# Patient Record
Sex: FEMALE | Race: WHITE | NOT HISPANIC OR LATINO | Employment: FULL TIME | URBAN - METROPOLITAN AREA
[De-identification: names, ages, dates, MRNs, and addresses within clinical notes are randomized per-mention and may not be internally consistent; named-entity substitution may affect disease eponyms.]

---

## 2023-12-13 ENCOUNTER — APPOINTMENT (OUTPATIENT)
Dept: RADIOLOGY | Facility: HOSPITAL | Age: 53
End: 2023-12-13
Payer: COMMERCIAL

## 2023-12-13 ENCOUNTER — HOSPITAL ENCOUNTER (EMERGENCY)
Facility: HOSPITAL | Age: 53
Discharge: HOME | End: 2023-12-13
Payer: COMMERCIAL

## 2023-12-13 VITALS
TEMPERATURE: 97.9 F | SYSTOLIC BLOOD PRESSURE: 155 MMHG | HEART RATE: 99 BPM | RESPIRATION RATE: 18 BRPM | HEIGHT: 72 IN | BODY MASS INDEX: 21.05 KG/M2 | DIASTOLIC BLOOD PRESSURE: 94 MMHG | OXYGEN SATURATION: 96 % | WEIGHT: 155.42 LBS

## 2023-12-13 DIAGNOSIS — M54.42 LOW BACK PAIN WITH LEFT-SIDED SCIATICA, UNSPECIFIED BACK PAIN LATERALITY, UNSPECIFIED CHRONICITY: Primary | ICD-10-CM

## 2023-12-13 PROCEDURE — 99283 EMERGENCY DEPT VISIT LOW MDM: CPT | Mod: 25

## 2023-12-13 PROCEDURE — 2500000001 HC RX 250 WO HCPCS SELF ADMINISTERED DRUGS (ALT 637 FOR MEDICARE OP): Performed by: HOME HEALTH

## 2023-12-13 PROCEDURE — 72100 X-RAY EXAM L-S SPINE 2/3 VWS: CPT

## 2023-12-13 PROCEDURE — 72100 X-RAY EXAM L-S SPINE 2/3 VWS: CPT | Performed by: RADIOLOGY

## 2023-12-13 PROCEDURE — 2500000005 HC RX 250 GENERAL PHARMACY W/O HCPCS: Performed by: HOME HEALTH

## 2023-12-13 RX ORDER — LIDOCAINE 50 MG/G
1 PATCH TOPICAL DAILY
Qty: 30 PATCH | Refills: 0 | Status: SHIPPED | OUTPATIENT
Start: 2023-12-13

## 2023-12-13 RX ORDER — METHYLPREDNISOLONE 4 MG/1
TABLET ORAL
Qty: 21 TABLET | Refills: 0 | Status: SHIPPED | OUTPATIENT
Start: 2023-12-13 | End: 2023-12-20

## 2023-12-13 RX ORDER — HYDROCODONE BITARTRATE AND ACETAMINOPHEN 5; 325 MG/1; MG/1
1 TABLET ORAL ONCE
Status: COMPLETED | OUTPATIENT
Start: 2023-12-13 | End: 2023-12-13

## 2023-12-13 RX ORDER — LIDOCAINE 560 MG/1
1 PATCH PERCUTANEOUS; TOPICAL; TRANSDERMAL DAILY
Status: DISCONTINUED | OUTPATIENT
Start: 2023-12-13 | End: 2023-12-13 | Stop reason: HOSPADM

## 2023-12-13 RX ORDER — CYCLOBENZAPRINE HCL 10 MG
5 TABLET ORAL ONCE
Status: COMPLETED | OUTPATIENT
Start: 2023-12-13 | End: 2023-12-13

## 2023-12-13 RX ORDER — CYCLOBENZAPRINE HCL 5 MG
5 TABLET ORAL 3 TIMES DAILY PRN
Qty: 9 TABLET | Refills: 0 | Status: SHIPPED | OUTPATIENT
Start: 2023-12-13 | End: 2023-12-23

## 2023-12-13 RX ORDER — HYDROCODONE BITARTRATE AND ACETAMINOPHEN 5; 325 MG/1; MG/1
1 TABLET ORAL EVERY 6 HOURS PRN
Qty: 9 TABLET | Refills: 0 | Status: SHIPPED | OUTPATIENT
Start: 2023-12-13 | End: 2023-12-16

## 2023-12-13 RX ADMIN — HYDROCODONE BITARTRATE AND ACETAMINOPHEN 1 TABLET: 5; 325 TABLET ORAL at 10:38

## 2023-12-13 RX ADMIN — LIDOCAINE 1 PATCH: 4 PATCH TOPICAL at 10:39

## 2023-12-13 RX ADMIN — CYCLOBENZAPRINE HYDROCHLORIDE 5 MG: 10 TABLET, FILM COATED ORAL at 10:38

## 2023-12-13 ASSESSMENT — LIFESTYLE VARIABLES
EVER HAD A DRINK FIRST THING IN THE MORNING TO STEADY YOUR NERVES TO GET RID OF A HANGOVER: NO
REASON UNABLE TO ASSESS: NO
EVER FELT BAD OR GUILTY ABOUT YOUR DRINKING: NO
HAVE PEOPLE ANNOYED YOU BY CRITICIZING YOUR DRINKING: NO
HAVE YOU EVER FELT YOU SHOULD CUT DOWN ON YOUR DRINKING: NO

## 2023-12-13 ASSESSMENT — COLUMBIA-SUICIDE SEVERITY RATING SCALE - C-SSRS
2. HAVE YOU ACTUALLY HAD ANY THOUGHTS OF KILLING YOURSELF?: NO
6. HAVE YOU EVER DONE ANYTHING, STARTED TO DO ANYTHING, OR PREPARED TO DO ANYTHING TO END YOUR LIFE?: NO
1. IN THE PAST MONTH, HAVE YOU WISHED YOU WERE DEAD OR WISHED YOU COULD GO TO SLEEP AND NOT WAKE UP?: NO

## 2023-12-13 ASSESSMENT — PAIN - FUNCTIONAL ASSESSMENT: PAIN_FUNCTIONAL_ASSESSMENT: 0-10

## 2023-12-13 ASSESSMENT — PAIN SCALES - GENERAL: PAINLEVEL_OUTOF10: 10 - WORST POSSIBLE PAIN

## 2023-12-13 NOTE — ED PROVIDER NOTES
"HPI   Chief Complaint   Patient presents with    Back Pain     \"Here for lower back pain that started Saturday.'       Patient is a 52-year-old female presenting to the ED with low back pain.  States that symptoms started nearly 2 days ago.  States that she was previously in a bad accident that resulted in significant trauma to her right leg at the time she also injured her back.  States that in April of this year she had an MRI of her back in which it showed that she had multiple slipped disc.  She has not had any problems with her back however until the last 2 days.  Denies any specific injury or trauma but she does work at [employer].  No known history of sciatica but does state that the pain radiates down her left leg.  Describes the pain as a burning, pulling and sharp sensation.  No fever or chills.  No bowel or bladder incontinence.  No urinary retention.  No saddle anesthesia.  No dysuria, hematuria increasing frequency.  No nausea, vomiting or abdominal pain.  No chest pain or shortness of breath.                          Perez Coma Scale Score: 15                  Patient History   Past Medical History:   Diagnosis Date    Personal history of other diseases of the respiratory system     History of asthma     Past Surgical History:   Procedure Laterality Date    OTHER SURGICAL HISTORY  02/09/2021    Knee surgery     No family history on file.  Social History     Tobacco Use    Smoking status: Never    Smokeless tobacco: Never   Vaping Use    Vaping Use: Never used   Substance Use Topics    Alcohol use: Never    Drug use: Never       Physical Exam   ED Triage Vitals [12/13/23 0948]   Temp Heart Rate Resp BP   36.6 °C (97.9 °F) 99 18 (!) 155/94      SpO2 Temp Source Heart Rate Source Patient Position   96 % Tympanic Monitor Sitting      BP Location FiO2 (%)     Right arm --       Physical Exam  Vitals reviewed.   Constitutional:       Appearance: Normal appearance.   HENT:      Head: Normocephalic.      Nose: Nose " normal.      Mouth/Throat:      Mouth: Mucous membranes are moist.      Pharynx: Oropharynx is clear.   Eyes:      Extraocular Movements: Extraocular movements intact.      Pupils: Pupils are equal, round, and reactive to light.   Cardiovascular:      Rate and Rhythm: Normal rate and regular rhythm.      Pulses: Normal pulses.      Heart sounds: Normal heart sounds.   Pulmonary:      Effort: Pulmonary effort is normal.      Breath sounds: Normal breath sounds. No wheezing, rhonchi or rales.   Musculoskeletal:         General: Normal range of motion.      Cervical back: Normal range of motion.      Comments: No midline C, T L-spine tenderness.  Reproducible paraspinal tenderness in the lumbar region as well as left gluteal region that this is pain to radiate down the left leg.  Patient is standing and ambulating well in the room.  Plantar and dorsiflexion intact bilateral.  Knee flexion extension intact follow.  Hip flexion extension intact bilateral.  Dorsal pedis pulse 2+.   Skin:     General: Skin is warm.   Neurological:      General: No focal deficit present.      Mental Status: She is alert. Mental status is at baseline.   Psychiatric:         Mood and Affect: Mood normal.         Behavior: Behavior normal.       XR lumbar spine 2-3 views   Final Result   Limited study by the patient's body habitus        Compression deformity of the superior endplate of L4, of uncertain   age.        Minimal anterior slippage of L5 on S1, which may be due to bilateral   pars defects of L5 versus degenerative in etiology.        Multilevel lumbar spondylosis             MACRO:   None        Signed by: Tamir Gonzalez 12/13/2023 11:36 AM   Dictation workstation:   [id number]          ED Course & MDM   Diagnoses as of 12/13/23 1210   Low back pain with left-sided sciatica, unspecified back pain laterality, unspecified chronicity       Medical Decision Making  Independent Historians: Patient    MDM:   52-year-old female presenting  to the ED with low back pain with left leg radiculopathy.  States that approximately a year ago she was about accident at that time had significant injury of her left leg.  States that since then she has had pain of her lower back.  Had a previous MRI in April which showed multiple slipped disc.  States that she typically does not have this pain but 2 days ago she started having significant pain in her lower back.  No known history of sciatica.  Denies bowel or bladder incontinence.  Denies urinary retention.  Denies saddle anesthesia.  Denies dysuria, hematuria creasing frequency.  No fever or chills.  On exam patient appears uncomfortable while she is standing in the room bent over.  States that she cannot get comfortable while sitting or laying down.  Has no midline C, T L-spine tenderness.  Reproducible paraspinal tenderness along the left lumbar paraspinal region and left gluteal region.  No saddle anesthesia.  Plantar and dorsiflexion intact bilateral.  Knee flexion extension intact bilateral.  Hip flexion extension intact bilateral.  Dorsal pedis pulse 2+.  clinically low suspicion of cauda equina or cord compression. Patient given Norco p.o., Flexeril p.o. and Lidoderm patch after initial assessment.  X-ray of the L-spine ordered to further evaluate symptoms.      X-ray interpreted by radiologist suggest compression deformity of the superior endplate of L4 uncertain by age.  Minimal anterior slippage of L5 on S1.  This may be due to bilateral pars defect of L5 versus degenerative etiology.  Multilevel lumbar spondylosis.  Discussed this finding with patient and family which they understand.    Patient was reassessed, patient was able to lay on the bed and states that this is the first time she been able to lay for the last 2 days.  States that she is feeling slightly better however not completely resolved.  Discussed with patient that symptoms are consistent with sciatic nerve irritation.  Will send the  patient home with a prescription of Solu-Medrol, Flexeril p.o., Lidoderm patch due to her inability to take anti-inflammatories I will send home with a short course of [place].  Did review her OARRS which she has no active prescriptions at this time.  Advised stretching techniques to help with her sciatic nerve.  Also give the patient referral to [facility] for further evaluation and management of her symptoms I discussed alternative treatments which include pain management versus physical therapy versus further imaging.  Patient agrees this plan has no further questions.  Patient stable for discharge as she was visualized ambulating emergency part without complication.  Diagnosing the patient with low back pain with left sciatica.    DDx/Differential:  Disc herniation, fracture, sciatica, muscle strain    Diagnosis: low back pain with left-sided sciatica        Dictation Disclaimer: Due to voice recognition software, sound alike and misspelled words may be contained in documentation. If you have any questions please contact me.            Procedure  Procedures     Julio Hugo PA-C  12/13/23 0238

## 2023-12-18 ENCOUNTER — HOSPITAL ENCOUNTER (EMERGENCY)
Facility: HOSPITAL | Age: 53
Discharge: HOME | End: 2023-12-18
Attending: EMERGENCY MEDICINE
Payer: COMMERCIAL

## 2023-12-18 ENCOUNTER — OFFICE VISIT (OUTPATIENT)
Dept: ORTHOPEDIC SURGERY | Facility: CLINIC | Age: 53
End: 2023-12-18
Payer: COMMERCIAL

## 2023-12-18 ENCOUNTER — APPOINTMENT (OUTPATIENT)
Dept: RADIOLOGY | Facility: HOSPITAL | Age: 53
End: 2023-12-18
Payer: COMMERCIAL

## 2023-12-18 VITALS
BODY MASS INDEX: 20.99 KG/M2 | WEIGHT: 149.91 LBS | OXYGEN SATURATION: 98 % | RESPIRATION RATE: 16 BRPM | HEIGHT: 71 IN | DIASTOLIC BLOOD PRESSURE: 74 MMHG | HEART RATE: 70 BPM | SYSTOLIC BLOOD PRESSURE: 128 MMHG | TEMPERATURE: 98.1 F

## 2023-12-18 DIAGNOSIS — M54.16 LUMBAR RADICULOPATHY: Primary | ICD-10-CM

## 2023-12-18 DIAGNOSIS — M51.369 L4-L5 DISC BULGE: Primary | ICD-10-CM

## 2023-12-18 DIAGNOSIS — M51.36 L4-L5 DISC BULGE: Primary | ICD-10-CM

## 2023-12-18 LAB
ANION GAP SERPL CALC-SCNC: 14 MMOL/L (ref 10–20)
BASOPHILS # BLD AUTO: 0.05 X10*3/UL (ref 0–0.1)
BASOPHILS NFR BLD AUTO: 0.7 %
BUN SERPL-MCNC: 12 MG/DL (ref 6–23)
CALCIUM SERPL-MCNC: 9.6 MG/DL (ref 8.6–10.3)
CHLORIDE SERPL-SCNC: 99 MMOL/L (ref 98–107)
CO2 SERPL-SCNC: 28 MMOL/L (ref 21–32)
CREAT SERPL-MCNC: 0.74 MG/DL (ref 0.5–1.05)
EOSINOPHIL # BLD AUTO: 0.1 X10*3/UL (ref 0–0.7)
EOSINOPHIL NFR BLD AUTO: 1.5 %
ERYTHROCYTE [DISTWIDTH] IN BLOOD BY AUTOMATED COUNT: 12.2 % (ref 11.5–14.5)
GFR SERPL CREATININE-BSD FRML MDRD: >90 ML/MIN/1.73M*2
GLUCOSE SERPL-MCNC: 85 MG/DL (ref 74–99)
HCT VFR BLD AUTO: 41.7 % (ref 36–46)
HGB BLD-MCNC: 14.3 G/DL (ref 12–16)
IMM GRANULOCYTES # BLD AUTO: 0.02 X10*3/UL (ref 0–0.7)
IMM GRANULOCYTES NFR BLD AUTO: 0.3 % (ref 0–0.9)
LYMPHOCYTES # BLD AUTO: 1.76 X10*3/UL (ref 1.2–4.8)
LYMPHOCYTES NFR BLD AUTO: 26.2 %
MCH RBC QN AUTO: 30.6 PG (ref 26–34)
MCHC RBC AUTO-ENTMCNC: 34.3 G/DL (ref 32–36)
MCV RBC AUTO: 89 FL (ref 80–100)
MONOCYTES # BLD AUTO: 0.58 X10*3/UL (ref 0.1–1)
MONOCYTES NFR BLD AUTO: 8.6 %
NEUTROPHILS # BLD AUTO: 4.2 X10*3/UL (ref 1.2–7.7)
NEUTROPHILS NFR BLD AUTO: 62.7 %
NRBC BLD-RTO: 0 /100 WBCS (ref 0–0)
PLATELET # BLD AUTO: 288 X10*3/UL (ref 150–450)
POTASSIUM SERPL-SCNC: 3.6 MMOL/L (ref 3.5–5.3)
RBC # BLD AUTO: 4.68 X10*6/UL (ref 4–5.2)
SODIUM SERPL-SCNC: 137 MMOL/L (ref 136–145)
WBC # BLD AUTO: 6.7 X10*3/UL (ref 4.4–11.3)

## 2023-12-18 PROCEDURE — 82374 ASSAY BLOOD CARBON DIOXIDE: CPT | Performed by: NURSE PRACTITIONER

## 2023-12-18 PROCEDURE — 36415 COLL VENOUS BLD VENIPUNCTURE: CPT | Performed by: NURSE PRACTITIONER

## 2023-12-18 PROCEDURE — 96375 TX/PRO/DX INJ NEW DRUG ADDON: CPT

## 2023-12-18 PROCEDURE — 80048 BASIC METABOLIC PNL TOTAL CA: CPT | Performed by: NURSE PRACTITIONER

## 2023-12-18 PROCEDURE — 72148 MRI LUMBAR SPINE W/O DYE: CPT

## 2023-12-18 PROCEDURE — 99284 EMERGENCY DEPT VISIT MOD MDM: CPT | Performed by: EMERGENCY MEDICINE

## 2023-12-18 PROCEDURE — 85025 COMPLETE CBC W/AUTO DIFF WBC: CPT | Performed by: NURSE PRACTITIONER

## 2023-12-18 PROCEDURE — 96374 THER/PROPH/DIAG INJ IV PUSH: CPT

## 2023-12-18 PROCEDURE — 99214 OFFICE O/P EST MOD 30 MIN: CPT | Performed by: PHYSICIAN ASSISTANT

## 2023-12-18 PROCEDURE — 72148 MRI LUMBAR SPINE W/O DYE: CPT | Performed by: RADIOLOGY

## 2023-12-18 PROCEDURE — 2500000004 HC RX 250 GENERAL PHARMACY W/ HCPCS (ALT 636 FOR OP/ED): Performed by: NURSE PRACTITIONER

## 2023-12-18 RX ORDER — PREDNISONE 20 MG/1
TABLET ORAL
Qty: 18 TABLET | Refills: 0 | Status: SHIPPED | OUTPATIENT
Start: 2023-12-18 | End: 2023-12-27

## 2023-12-18 RX ORDER — OXYCODONE AND ACETAMINOPHEN 5; 325 MG/1; MG/1
1 TABLET ORAL EVERY 6 HOURS PRN
Qty: 20 TABLET | Refills: 0 | Status: SHIPPED | OUTPATIENT
Start: 2023-12-18

## 2023-12-18 RX ORDER — DIAZEPAM 2 MG/1
TABLET ORAL
Qty: 18 TABLET | Refills: 0 | Status: SHIPPED | OUTPATIENT
Start: 2023-12-18

## 2023-12-18 RX ORDER — DIAZEPAM 5 MG/ML
2 INJECTION, SOLUTION INTRAMUSCULAR; INTRAVENOUS ONCE
Status: COMPLETED | OUTPATIENT
Start: 2023-12-18 | End: 2023-12-18

## 2023-12-18 RX ORDER — MORPHINE SULFATE 4 MG/ML
4 INJECTION, SOLUTION INTRAMUSCULAR; INTRAVENOUS ONCE
Status: COMPLETED | OUTPATIENT
Start: 2023-12-18 | End: 2023-12-18

## 2023-12-18 RX ORDER — DEXAMETHASONE SODIUM PHOSPHATE 10 MG/ML
10 INJECTION INTRAMUSCULAR; INTRAVENOUS ONCE
Status: COMPLETED | OUTPATIENT
Start: 2023-12-18 | End: 2023-12-18

## 2023-12-18 RX ADMIN — MORPHINE SULFATE 4 MG: 4 INJECTION, SOLUTION INTRAMUSCULAR; INTRAVENOUS at 16:20

## 2023-12-18 RX ADMIN — DIAZEPAM 2 MG: 10 INJECTION, SOLUTION INTRAMUSCULAR; INTRAVENOUS at 16:22

## 2023-12-18 RX ADMIN — DEXAMETHASONE SODIUM PHOSPHATE 10 MG: 10 INJECTION, SOLUTION INTRAMUSCULAR; INTRAVENOUS at 16:16

## 2023-12-18 ASSESSMENT — COLUMBIA-SUICIDE SEVERITY RATING SCALE - C-SSRS
6. HAVE YOU EVER DONE ANYTHING, STARTED TO DO ANYTHING, OR PREPARED TO DO ANYTHING TO END YOUR LIFE?: NO
2. HAVE YOU ACTUALLY HAD ANY THOUGHTS OF KILLING YOURSELF?: NO
1. IN THE PAST MONTH, HAVE YOU WISHED YOU WERE DEAD OR WISHED YOU COULD GO TO SLEEP AND NOT WAKE UP?: NO

## 2023-12-18 ASSESSMENT — PAIN - FUNCTIONAL ASSESSMENT
PAIN_FUNCTIONAL_ASSESSMENT: 0-10
PAIN_FUNCTIONAL_ASSESSMENT: 0-10

## 2023-12-18 ASSESSMENT — LIFESTYLE VARIABLES
EVER FELT BAD OR GUILTY ABOUT YOUR DRINKING: NO
HAVE YOU EVER FELT YOU SHOULD CUT DOWN ON YOUR DRINKING: NO
HAVE PEOPLE ANNOYED YOU BY CRITICIZING YOUR DRINKING: NO
EVER HAD A DRINK FIRST THING IN THE MORNING TO STEADY YOUR NERVES TO GET RID OF A HANGOVER: NO
REASON UNABLE TO ASSESS: NO

## 2023-12-18 ASSESSMENT — PAIN SCALES - GENERAL
PAINLEVEL_OUTOF10: 8
PAINLEVEL_OUTOF10: 8
PAINLEVEL_OUTOF10: 4

## 2023-12-18 NOTE — PROGRESS NOTES
Kristy Hoover is a 53 y.o. female who presents for Pain of the Lower Back.    HPI:  53-year-old female here for low back pain mostly left-sided with left leg radicular pain.  She denies any fever chills nausea vomiting ulcers.  She has no bowel or bladder complaints.  The notes from [facility] emergency department from December 13, 2023 were reviewed.    Physical exam:  Well-nourished, well kept.she can rise from a seated position, she can sit from a standing position with some difficulty.  She can stand on her heels and toes with some difficulty as well.  She has a steady gait although she seems a little antalgic on the left.   Examination of the back shows moderate tenderness in the paraspinous musculature.  There is decreased range of motion in all directions due to guarding/muscle spasms and pain at extremes.  There is good strength and no instability.  Examination of the lower extremities reveals no point tenderness, swelling, or deformity.  Range of motion of the hips, knees, and ankles are full without crepitance, instability, or exacerbation of pain.  Strength is 5/5 throughout except knee flexion extension on the left is about 4+/5, this is a pain guarding response.  No redness, abrasions, or lesions on extremities  Gross sensation intact in the extremities.  Deep tendon reflexes 2+ bilateral. Clonus negative.  Affect normal.  Alert and oriented ×3.  Coordination normal.    Imaging studies:  3 views of the lumbar spine from December 13, 2023 from [facility] emergency department were reviewed.  It shows potential superior endplate compression deformity of L4, small spondylolisthesis of L5 on S1, there may be bilateral L5 pars defects there.    Assessment:  53-year-old female here for workers comp related back pain.  This all resulted from a crushing injury several years ago.  She has had some issues with her back in the past, over the past several days that she has had an extreme exacerbation of low back pain and left leg  pain.  She had prior issues with numbness and tingling in her legs however she has been seeing a chiropractor throughout the year who has helped her quite a bit although this new flareup is worse than she is experienced before.  Left low back pain, radiating pain into the left leg throughout the left leg.  It is 50-50 back versus leg.  Nothing on the right really.  The pain got so bad at 1 point she had to take a Xanax.  She was seen in the emergency department on 13 December and given some steroid and muscle relaxers although this did not help.  This is significantly affecting her bodily function.  She cannot work, she has a lot of difficulty walking.    Plan:  I wanted to get an MRI of her lumbar spine, she thought she had 1 in April of this year but I cannot find that anywhere in the chart, she does not have a disc.  She states that she cannot wait 7 to 10 days to get approval and scheduling of the MRI she is in too much pain.  She states she wants to go back to the emergency department and try and get the MRI there.  I reached out to our on-call provider today, I reached out to our in-house orthopedic physician assistant and gave them report, I gave report to the emergency department that the patient is coming.  They can manage her pain however it is appropriate per protocol but I would like her to get an MRI of her lumbar spine without contrast.  I can see her back when that is complete.

## 2023-12-18 NOTE — ED PROVIDER NOTES
"HPI   Chief Complaint   Patient presents with    Back Pain     \"Here for back pain that has been going on for 9 days.  No injury or falls.  Went to center for ortho and they would not do MRI so she came [place] [facility] [place].\"       53-year-old female returns the emergency department, had presented several days ago complaining of low back pain, saw the orthopedic clinic today who recommended she return back to the emergency department for an urgent MRI.  She describes a severe lower back pain that radiates into her left lower extremity, all all the way to her toes, states when she attempts to stand or weight-bear on her legs the pain is significantly worse, she has been crawling around on the floor due to the severe pain.  She denies any incontinence of bowel/bladder or saddle anesthesia, although states several days ago she was having these symptoms.    Patient states earlier this year she did have an MRI that showed 4 herniated disks.  Neuro spine PAs concerns these may have further herniated or degenerated and is recommending emergent MRI      History provided by:  Patient   used: No                        [place] Coma Scale Score: 15                  Patient History   Past Medical History:   Diagnosis Date    Personal history of other diseases of the respiratory system     History of asthma     Past Surgical History:   Procedure Laterality Date    OTHER SURGICAL HISTORY  02/09/2021    Knee surgery     No family history on file.  Social History     Tobacco Use    Smoking status: Never    Smokeless tobacco: Never   Vaping Use    Vaping Use: Never used   Substance Use Topics    Alcohol use: Never    Drug use: Yes     Comment: cbd gummie       Physical Exam   ED Triage Vitals [12/18/23 1106]   Temp Heart Rate Resp BP   36.7 °C (98.1 °F) 90 16 137/65      SpO2 Temp Source Heart Rate Source Patient Position   99 % Tympanic Monitor Sitting      BP Location FiO2 (%)     Right arm --       Physical " Exam  Gen.: Vitals noted no distress. Afebrile.   Heart: Regular rate rhythm no murmur.   Lungs: Clear to auscultation bilaterally with good aeration and no adventitious breath sounds.   Back: There is tenderness to palpation in the midline and paraspinal planes throughout the LS. Normal motor sensory, symmetric reflexes, strong equal peripheral pulses, normal Babinski's bilaterally.   Skin: No rash.   Neuro: No focal neurologic deficits, NIH score of 0.     ED Course & MDM   Diagnoses as of 12/18/23 1557   L4-L5 disc bulge     Labs Reviewed   BASIC METABOLIC PANEL - Normal       Result Value    Glucose 85      Sodium 137      Potassium 3.6      Chloride 99      Bicarbonate 28      Anion Gap 14      Urea Nitrogen 12      Creatinine 0.74      eGFR >90      Calcium 9.6     CBC WITH AUTO DIFFERENTIAL    WBC 6.7      nRBC 0.0      RBC 4.68      Hemoglobin 14.3      Hematocrit 41.7      MCV 89      MCH 30.6      MCHC 34.3      RDW 12.2      Platelets 288      Neutrophils % 62.7      Immature Granulocytes %, Automated 0.3      Lymphocytes % 26.2      Monocytes % 8.6      Eosinophils % 1.5      Basophils % 0.7      Neutrophils Absolute 4.20      Immature Granulocytes Absolute, Automated 0.02      Lymphocytes Absolute 1.76      Monocytes Absolute 0.58      Eosinophils Absolute 0.10      Basophils Absolute 0.05          MR lumbar spine wo IV contrast   Final Result   Large central and left paracentral disc herniation at L4-L5 as   detailed. It causes moderate central canal stenosis and effacement of   left lateral recess at L4-L5 and upper L5 levels with mass effect on   adjacent nerve roots.        Additional findings as detailed.        Signed by: Ashely Hunt 12/18/2023 2:39 PM   Dictation workstation:   [id number]           Medical Decision Making  Discussed with orthospine PA, he did recommend she obtain an urgent MRI.    MRI lumbar spine was ordered.  Results as documented.    I did reach out, spoke with Dr. Nugent  who consulted neuro spine at [facility], Dr. Dev Ahmadi, who felt that if the patient could not tolerate her symptoms that she could be transferred to their facility as Dr. Cortez is out of town this week.    I spoke with the patient, she would like to try pain medications and medically managing her symptoms, would like to try physical therapy as well.    Had extensive conversation with the patient about her MRI findings and the recommendation for surgery.  She would feel more comfortable waiting for Dr. Cortez to evaluate her.    Patient was medicated with Valium, morphine and Decadron in the department, will be discharged home on similar medications.    Spoke with ISAC Galvan who will place referral for PT.    Patient understands that she should return with any worsening symptoms or any additional concerns        Shared NABIL Attestation:    This patient was seen by the advanced practice provider.  I have personally performed a substantive portion of the encounter including the physical exam.  My assessment reveals: Regular rate and rhythm cardiac exam with clear breath sounds bilaterally.  Abdomen is soft and nontender.  Lumbar spine has some tenderness to palpation but no step-offs or deformities.  There is no saddle anesthesia.  Neurological exam is grossly intact.    I have seen and examined the patient, agree with the workup, evaluation, medical decision making, management and diagnosis.  The care plan has been discussed.     Heladio Russell MD    Procedure  Procedures     Vidya Bo, APRN-CNP  12/18/23 3720

## 2023-12-18 NOTE — DISCHARGE INSTRUCTIONS
Physical therapy was arranged by the PA at the orthopedic clinic.    If pain is intolerable you may return here or to [facility] for hospitalization and/or surgical evaluation.

## 2023-12-22 ENCOUNTER — TELEPHONE (OUTPATIENT)
Dept: ORTHOPEDIC SURGERY | Facility: CLINIC | Age: 53
End: 2023-12-22
Payer: COMMERCIAL

## 2023-12-22 DIAGNOSIS — M54.16 LUMBAR RADICULOPATHY: ICD-10-CM

## 2023-12-22 NOTE — TELEPHONE ENCOUNTER
Pt called stating has only 5 pain pills left afraid she may not make it til Tues re: the pain without going to [facility] again. I advised her if still as bad to come in first thing Tues thru cast room.

## 2023-12-26 ENCOUNTER — HOSPITAL ENCOUNTER (INPATIENT)
Age: 53
LOS: 1 days | Discharge: HOME | DRG: 520 | End: 2023-12-27
Attending: ORTHOPAEDIC SURGERY | Admitting: ORTHOPAEDIC SURGERY
Payer: COMMERCIAL

## 2023-12-26 ENCOUNTER — OFFICE VISIT (OUTPATIENT)
Dept: ORTHOPEDIC SURGERY | Facility: CLINIC | Age: 53
End: 2023-12-26
Payer: COMMERCIAL

## 2023-12-26 DIAGNOSIS — M54.10 BACK PAIN WITH RADICULOPATHY: Primary | ICD-10-CM

## 2023-12-26 DIAGNOSIS — M51.26 HERNIATED LUMBAR INTERVERTEBRAL DISC: ICD-10-CM

## 2023-12-26 DIAGNOSIS — M51.26 LUMBAR HERNIATED DISC: Primary | ICD-10-CM

## 2023-12-26 LAB
ABO + RH BLD: NORMAL
ALBUMIN SERPL-MCNC: 4.2 G/DL (ref 3.5–4.6)
ALP SERPL-CCNC: 63 U/L (ref 40–130)
ALT SERPL-CCNC: 10 U/L (ref 0–33)
ANION GAP SERPL CALCULATED.3IONS-SCNC: 11 MEQ/L (ref 9–15)
ANION GAP SERPL CALCULATED.3IONS-SCNC: 12 MEQ/L (ref 9–15)
APTT PPP: 25.4 SEC (ref 24.4–36.8)
AST SERPL-CCNC: 10 U/L (ref 0–35)
BILIRUB SERPL-MCNC: 0.3 MG/DL (ref 0.2–0.7)
BLD GP AB SCN SERPL QL: NORMAL
BUN SERPL-MCNC: 10 MG/DL (ref 6–20)
BUN SERPL-MCNC: 11 MG/DL (ref 6–20)
CALCIUM SERPL-MCNC: 9.2 MG/DL (ref 8.5–9.9)
CALCIUM SERPL-MCNC: 9.5 MG/DL (ref 8.5–9.9)
CHLORIDE SERPL-SCNC: 101 MEQ/L (ref 95–107)
CHLORIDE SERPL-SCNC: 98 MEQ/L (ref 95–107)
CO2 SERPL-SCNC: 26 MEQ/L (ref 20–31)
CO2 SERPL-SCNC: 27 MEQ/L (ref 20–31)
CREAT SERPL-MCNC: 0.64 MG/DL (ref 0.5–0.9)
CREAT SERPL-MCNC: 0.65 MG/DL (ref 0.5–0.9)
ERYTHROCYTE [DISTWIDTH] IN BLOOD BY AUTOMATED COUNT: 12.2 % (ref 11.5–14.5)
GLOBULIN SER CALC-MCNC: 2.2 G/DL (ref 2.3–3.5)
GLUCOSE SERPL-MCNC: 103 MG/DL (ref 70–99)
GLUCOSE SERPL-MCNC: 111 MG/DL (ref 70–99)
HCT VFR BLD AUTO: 42.9 % (ref 37–47)
HGB BLD-MCNC: 14.4 G/DL (ref 12–16)
INR PPP: 1
MCH RBC QN AUTO: 30.3 PG (ref 27–31.3)
MCHC RBC AUTO-ENTMCNC: 33.6 % (ref 33–37)
MCV RBC AUTO: 90.1 FL (ref 79.4–94.8)
PLATELET # BLD AUTO: 310 K/UL (ref 130–400)
POTASSIUM SERPL-SCNC: 3.9 MEQ/L (ref 3.4–4.9)
POTASSIUM SERPL-SCNC: 4.1 MEQ/L (ref 3.4–4.9)
PROT SERPL-MCNC: 6.4 G/DL (ref 6.3–8)
PROTHROMBIN TIME: 13.5 SEC (ref 12.3–14.9)
RBC # BLD AUTO: 4.76 M/UL (ref 4.2–5.4)
SODIUM SERPL-SCNC: 135 MEQ/L (ref 135–144)
SODIUM SERPL-SCNC: 140 MEQ/L (ref 135–144)
WBC # BLD AUTO: 7.2 K/UL (ref 4.8–10.8)

## 2023-12-26 PROCEDURE — 6370000000 HC RX 637 (ALT 250 FOR IP): Performed by: NURSE PRACTITIONER

## 2023-12-26 PROCEDURE — 1210000000 HC MED SURG R&B

## 2023-12-26 PROCEDURE — 99215 OFFICE O/P EST HI 40 MIN: CPT | Performed by: ORTHOPAEDIC SURGERY

## 2023-12-26 PROCEDURE — G0463 HOSPITAL OUTPT CLINIC VISIT: HCPCS | Performed by: ORTHOPAEDIC SURGERY

## 2023-12-26 PROCEDURE — 86850 RBC ANTIBODY SCREEN: CPT

## 2023-12-26 PROCEDURE — 36415 COLL VENOUS BLD VENIPUNCTURE: CPT

## 2023-12-26 PROCEDURE — L0650 LSO SC R ANT/POS PNL PRE OTS: HCPCS | Performed by: ORTHOPAEDIC SURGERY

## 2023-12-26 PROCEDURE — 86900 BLOOD TYPING SEROLOGIC ABO: CPT

## 2023-12-26 PROCEDURE — 85027 COMPLETE CBC AUTOMATED: CPT

## 2023-12-26 PROCEDURE — 85730 THROMBOPLASTIN TIME PARTIAL: CPT

## 2023-12-26 PROCEDURE — 2580000003 HC RX 258: Performed by: NURSE PRACTITIONER

## 2023-12-26 PROCEDURE — 93005 ELECTROCARDIOGRAM TRACING: CPT | Performed by: ORTHOPAEDIC SURGERY

## 2023-12-26 PROCEDURE — 85610 PROTHROMBIN TIME: CPT

## 2023-12-26 PROCEDURE — 80053 COMPREHEN METABOLIC PANEL: CPT

## 2023-12-26 PROCEDURE — 86901 BLOOD TYPING SEROLOGIC RH(D): CPT

## 2023-12-26 RX ORDER — OXYCODONE HYDROCHLORIDE 5 MG/1
5 TABLET ORAL EVERY 8 HOURS PRN
Status: ON HOLD | COMMUNITY
End: 2023-12-27 | Stop reason: HOSPADM

## 2023-12-26 RX ORDER — TRANEXAMIC ACID 650 MG/1
1950 TABLET ORAL
Status: DISCONTINUED | OUTPATIENT
Start: 2023-12-26 | End: 2023-12-26

## 2023-12-26 RX ORDER — ACETAMINOPHEN 500 MG
1000 TABLET ORAL ONCE
Status: COMPLETED | OUTPATIENT
Start: 2023-12-27 | End: 2023-12-27

## 2023-12-26 RX ORDER — OXYCODONE HCL 10 MG/1
10 TABLET, FILM COATED, EXTENDED RELEASE ORAL ONCE
Status: COMPLETED | OUTPATIENT
Start: 2023-12-27 | End: 2023-12-27

## 2023-12-26 RX ORDER — OXYCODONE HYDROCHLORIDE 5 MG/1
5 TABLET ORAL EVERY 6 HOURS PRN
Status: DISCONTINUED | OUTPATIENT
Start: 2023-12-26 | End: 2023-12-27 | Stop reason: CLARIF

## 2023-12-26 RX ORDER — ACETAMINOPHEN 500 MG
1000 TABLET ORAL ONCE
Status: DISCONTINUED | OUTPATIENT
Start: 2023-12-26 | End: 2023-12-26

## 2023-12-26 RX ORDER — SODIUM CHLORIDE 9 MG/ML
INJECTION, SOLUTION INTRAVENOUS CONTINUOUS
Status: DISCONTINUED | OUTPATIENT
Start: 2023-12-26 | End: 2023-12-27 | Stop reason: HOSPADM

## 2023-12-26 RX ORDER — TRANEXAMIC ACID 650 MG/1
1950 TABLET ORAL
Status: DISCONTINUED | OUTPATIENT
Start: 2023-12-27 | End: 2023-12-27 | Stop reason: HOSPADM

## 2023-12-26 RX ORDER — OXYCODONE HCL 10 MG/1
10 TABLET, FILM COATED, EXTENDED RELEASE ORAL ONCE
Status: DISCONTINUED | OUTPATIENT
Start: 2023-12-26 | End: 2023-12-26

## 2023-12-26 RX ADMIN — SODIUM CHLORIDE: 9 INJECTION, SOLUTION INTRAVENOUS at 21:07

## 2023-12-26 RX ADMIN — OXYCODONE HYDROCHLORIDE 5 MG: 5 TABLET ORAL at 14:01

## 2023-12-26 RX ADMIN — OXYCODONE HYDROCHLORIDE 5 MG: 5 TABLET ORAL at 21:05

## 2023-12-26 NOTE — CARE COORDINATION
Case Management Assessment  Initial Evaluation    Date/Time of Evaluation: 12/26/2023 3:56 PM  Assessment Completed by: AUDELIA Brennan    If patient is discharged prior to next notation, then this note serves as note for discharge by case management. Patient Name: Ezequiel Torres                   YOB: 1970  Diagnosis: Lumbar herniated disc [M51.26]                   Date / Time: 12/26/2023 12:22 PM    Patient Admission Status: Inpatient   Readmission Risk (Low < 19, Mod (19-27), High > 27): No data recorded  Current PCP: Carlos Eduardo Grove MD  PCP verified by CM? Yes    Chart Reviewed: Yes      History Provided by: Patient  Patient Orientation: Alert and Oriented    Patient Cognition: Alert    Hospitalization in the last 30 days (Readmission):  No    If yes, Readmission Assessment in  Navigator will be completed. Advance Directives:      Code Status: Not on file   Patient's Primary Decision Maker is: Legal Next of Kin      Discharge Planning:    Patient lives with: Spouse/Significant Other Type of Home: House  Primary Care Giver: Self  Patient Support Systems include: Spouse/Significant Other   Current Financial resources: Other (Comment) (WORKERS COMP)  Current community resources: None  Current services prior to admission:              Current DME:              Type of Home Care services:       ADLS  Prior functional level: Independent in ADLs/IADLs  Current functional level: Other (see comment) (PEND PT.OT)    PT AM-PAC:   /24  OT AM-PAC:   /24    Family can provide assistance at DC: Yes  Would you like Case Management to discuss the discharge plan with any other family members/significant others, and if so, who? Yes (HSUBAND)  Plans to Return to Present Housing: Yes  Other Identified Issues/Barriers to RETURNING to current housing: MEDICAL CLEARANCE  Potential Assistance needed at discharge:  Outpatient PT/OT            Potential DME:    Patient expects to discharge to: House  Plan for

## 2023-12-26 NOTE — ACP (ADVANCE CARE PLANNING)
Advance Care Planning   Healthcare Decision Maker:    Primary Decision Maker: Bao Pierre - Spouse - [phone]    Click here to complete Healthcare Decision Makers including selection of the Healthcare Decision Maker Relationship (ie \"Primary\"). Today we documented Decision Maker(s) consistent with Legal Next of Kin hierarchy.        If the relationship to the patient does NOT follow our state's Next of Kin hierarchy, the patient MUST complete an ACP Document to allow him/her to act on the patient's behalf. :      Electronically signed by AUDELIA Simpson on 12/26/2023 at 3:59 PM

## 2023-12-26 NOTE — FLOWSHEET NOTE
Spoke to [employer] cnp regarding admission received orders for labs, oxy prn, hospitalist consult and ekg

## 2023-12-26 NOTE — PROGRESS NOTES
Established patient to the practice.  Seen by another physician assistant.  Also went to the emergency room.  She got a history of a work related injury on January 2021 when she was slammed against a pole at work.  She broke her right foot and had a open laceration to that right leg.  She she went back to work and she was working for months but she was having issues with her her foot where she felt some weakness in that foot and she would catch it as she was walking.  She incidentally had a fall last year because of that.  After that fall she was experiencing a lot of back pain left leg pain radicular pain sensory changes weakness in the left foot.  Another claim was made for that injury.  She states on December 8 and 9 her symptoms got better but on that third day her symptoms came back and are more worse.  She complains of severe back pain left leg pain radicular pain sensory changes numbness tingling.  Weakness in that leg.  She has been in the fetal position and hardly able to function.  She denies bowel or bladder complaints, saddle anesthesia, she does have gait and balance changes secondary to this pain and weakness.  She denies any spine surgery.  She denies being on any type of blood thinners.  She went to the emergency room she was treated with steroid she was given steroids here.  Muscle relaxants.  These are not helping at the present time.  She did over-the-counter meds also.    We read the emergency room note agree with her treatment referral to us for lumbar radiculopathy.  We also reviewed her recent lab work showing a glucose of 85 sodium 137 potassium 3.6.    Physical exam: Well-nourished, well kept.  No lymphangitis or lymphadenopathy in the examined extremities.  Good perfusion to the extremities ×4.  Radial and dorsalis pedis pulses 2+.  Capillary refill to all 4 digits brisk.  No distal edema x 4.  Patient able to stand and walk but she has an antalgic gait.  Examination of the neck reveals no  swelling, step-off, or point tenderness.  Range of motion with flexion, extension, side bending and rotation is well maintained without crepitance, instability, or exacerbation of pain.  Strength is within normal limits.  There is severe decreased range of motion flexion extension rotation lumbar spine tender lumbar spinal musculature good strength no instability examination of the upper extremities reveals no point tenderness, swelling, or deformity.  Range of motion of the shoulders, elbows, wrists, and fingers are all full without crepitance, instability, or exacerbation of pain.  Strength is 5/5 throughout.  She has weakness in the left dorsi and plantarflexion secondary to pain also.  Which is about 4/5 otherwise examination of the lower extremities reveals no point tenderness, swelling, or deformity.  Range of motion of the hips, knees, and ankles are full without crepitance, instability, or exacerbation of pain.  Strength is 5/5 throughout.  No redness, abrasions, or lesions on all 4 extremities, head and neck, or trunk.  Gross sensation intact in the extremities ×4.  Deep tendon reflexes 2+ and symmetric bilaterally.  Adrián, clonus, and Babinski were negative.  Straight leg raise negative.  Affect normal.  Alert and oriented ×3.  Coordination normal.    X-ray: X-ray that were taken prior report was reviewed as well show some arthritic degenerative changes L5-S1.  No fractures dislocations or bony lytic lesions.  MRI was reviewed and report was reviewed as well that was recently done.  Showing a large extruded disc fragment at L4-5 causing severe left foraminal narrowing and impingement on that nerve.  We will try to obtain lumbar flexion and extension x-rays today.    Assessment: This a patient Workmen's Comp. injury.  Severe back pain left leg pain is incapacitating her.  Barely able to function weakness in that left lower extremity.  She is here with her  who is also historian.  We talked about  treatment.  Surgical versus nonsurgical versus injections etc. we discussed all the risk and benefits the options of all those with her.  Please refer to Dr. Cortez's note for  for final plan    In a face-to-face encounter, I performed a history and physical examination, discussed pertinent diagnostic studies if indicated, and discussed diagnosis and management strategies with both the patient and the midlevel provider.  I reviewed the midlevel's note and agree with the documented findings and plan of care.    Severe left leg radiculopathy for the past 3 weeks.  However, the back pain and left leg pain has been going on for over a year from Worker's Comp. injury.  She had a very small window of improved pain early December but then it became quite severe after that.  She cannot function at home.  In the office today she is unable to stand or walk due to pain.  She needs to lie in the fetal position on the exam bed.  She has an MRI which shows a large left L4-5 herniated disc causing severe compression of the L5 nerve root.  I had a long discussion with the patient and explained to them that their options are 1) live with the symptoms and see how they evolve, 2) physical therapy, 3) pain management or 4) surgery.  She cannot live like this anymore.  She is severely inhibited with bodily function.  She just wants this fixed to soon as possible due to the excruciating pain and weakness in her left leg.  On exam I do get weakness with left ankle dorsiflexion which is 4/5 strength compared to the right.    All of the risks, benefits, and potential complications for operative and nonoperative treatment were discussed at length with the patient.  The patient understands that surgery is elective.  The patient understands that I do not have to do surgery.  The patient understands that surgery comes with more risks than not doing surgery.  Risks of operative intervention include, but are not limited to: Anesthesia  complications, excessive blood loss, infection, damaged to uninjured structures including, but not limited to, the dural sac and nerve roots, blood clots, and lack of improvement or worsening of symptoms.  These complications could result in death, permanent disability, or need for reoperation.  The patient completely understood those risks and wished to proceed with operative intervention.    We are going to direct admit the patient to the hospital given her danger to herself and for safety purposes given her difficulty ambulation and caring for herself.  Were going to perform an L4-5 left microdiscectomy tomorrow.

## 2023-12-27 ENCOUNTER — APPOINTMENT (OUTPATIENT)
Dept: GENERAL RADIOLOGY | Age: 53
End: 2023-12-27
Payer: COMMERCIAL

## 2023-12-27 ENCOUNTER — HOSPITAL ENCOUNTER (OUTPATIENT)
Dept: RADIOLOGY | Facility: EXTERNAL LOCATION | Age: 53
Discharge: HOME | End: 2023-12-27

## 2023-12-27 ENCOUNTER — ANESTHESIA (OUTPATIENT)
Dept: OPERATING ROOM | Age: 53
DRG: 520 | End: 2023-12-27
Payer: COMMERCIAL

## 2023-12-27 ENCOUNTER — ANESTHESIA EVENT (OUTPATIENT)
Dept: OPERATING ROOM | Age: 53
DRG: 520 | End: 2023-12-27
Payer: COMMERCIAL

## 2023-12-27 VITALS
HEART RATE: 86 BPM | HEIGHT: 72 IN | RESPIRATION RATE: 16 BRPM | SYSTOLIC BLOOD PRESSURE: 132 MMHG | WEIGHT: 147 LBS | BODY MASS INDEX: 19.91 KG/M2 | TEMPERATURE: 97.9 F | OXYGEN SATURATION: 97 % | DIASTOLIC BLOOD PRESSURE: 66 MMHG

## 2023-12-27 LAB
EKG ATRIAL RATE: 73 BPM
EKG P AXIS: 67 DEGREES
EKG P-R INTERVAL: 128 MS
EKG Q-T INTERVAL: 376 MS
EKG QRS DURATION: 104 MS
EKG QTC CALCULATION (BAZETT): 414 MS
EKG R AXIS: 27 DEGREES
EKG T AXIS: 63 DEGREES
EKG VENTRICULAR RATE: 73 BPM

## 2023-12-27 PROCEDURE — 97165 OT EVAL LOW COMPLEX 30 MIN: CPT

## 2023-12-27 PROCEDURE — 2580000003 HC RX 258: Performed by: ORTHOPAEDIC SURGERY

## 2023-12-27 PROCEDURE — 2720000010 HC SURG SUPPLY STERILE: Performed by: ORTHOPAEDIC SURGERY

## 2023-12-27 PROCEDURE — 3600000014 HC SURGERY LEVEL 4 ADDTL 15MIN: Performed by: ORTHOPAEDIC SURGERY

## 2023-12-27 PROCEDURE — 6360000002 HC RX W HCPCS: Performed by: ORTHOPAEDIC SURGERY

## 2023-12-27 PROCEDURE — 015B3ZZ DESTRUCTION OF LUMBAR NERVE, PERCUTANEOUS APPROACH: ICD-10-PCS | Performed by: ORTHOPAEDIC SURGERY

## 2023-12-27 PROCEDURE — A4217 STERILE WATER/SALINE, 500 ML: HCPCS | Performed by: ORTHOPAEDIC SURGERY

## 2023-12-27 PROCEDURE — 2500000003 HC RX 250 WO HCPCS: Performed by: NURSE ANESTHETIST, CERTIFIED REGISTERED

## 2023-12-27 PROCEDURE — 6360000002 HC RX W HCPCS: Performed by: ANESTHESIOLOGY

## 2023-12-27 PROCEDURE — 3700000001 HC ADD 15 MINUTES (ANESTHESIA): Performed by: ORTHOPAEDIC SURGERY

## 2023-12-27 PROCEDURE — 2580000003 HC RX 258: Performed by: NURSE PRACTITIONER

## 2023-12-27 PROCEDURE — 64999 UNLISTED PX NERVOUS SYSTEM: CPT | Performed by: ANESTHESIOLOGY

## 2023-12-27 PROCEDURE — 6360000002 HC RX W HCPCS: Performed by: NURSE ANESTHETIST, CERTIFIED REGISTERED

## 2023-12-27 PROCEDURE — 7100000000 HC PACU RECOVERY - FIRST 15 MIN: Performed by: ORTHOPAEDIC SURGERY

## 2023-12-27 PROCEDURE — 3600000004 HC SURGERY LEVEL 4 BASE: Performed by: ORTHOPAEDIC SURGERY

## 2023-12-27 PROCEDURE — 0SB20ZZ EXCISION OF LUMBAR VERTEBRAL DISC, OPEN APPROACH: ICD-10-PCS | Performed by: ORTHOPAEDIC SURGERY

## 2023-12-27 PROCEDURE — 3700000000 HC ANESTHESIA ATTENDED CARE: Performed by: ORTHOPAEDIC SURGERY

## 2023-12-27 PROCEDURE — 6370000000 HC RX 637 (ALT 250 FOR IP): Performed by: NURSE PRACTITIONER

## 2023-12-27 PROCEDURE — 2709999900 HC NON-CHARGEABLE SUPPLY: Performed by: ORTHOPAEDIC SURGERY

## 2023-12-27 PROCEDURE — 7100000001 HC PACU RECOVERY - ADDTL 15 MIN: Performed by: ORTHOPAEDIC SURGERY

## 2023-12-27 PROCEDURE — 76000 FLUOROSCOPY <1 HR PHYS/QHP: CPT

## 2023-12-27 PROCEDURE — C1713 ANCHOR/SCREW BN/BN,TIS/BN: HCPCS | Performed by: ORTHOPAEDIC SURGERY

## 2023-12-27 RX ORDER — SODIUM CHLORIDE 0.9 % (FLUSH) 0.9 %
5-40 SYRINGE (ML) INJECTION PRN
Status: DISCONTINUED | OUTPATIENT
Start: 2023-12-27 | End: 2023-12-27 | Stop reason: HOSPADM

## 2023-12-27 RX ORDER — PROPOFOL 10 MG/ML
INJECTION, EMULSION INTRAVENOUS PRN
Status: DISCONTINUED | OUTPATIENT
Start: 2023-12-27 | End: 2023-12-27 | Stop reason: SDUPTHER

## 2023-12-27 RX ORDER — ONDANSETRON 2 MG/ML
INJECTION INTRAMUSCULAR; INTRAVENOUS PRN
Status: DISCONTINUED | OUTPATIENT
Start: 2023-12-27 | End: 2023-12-27 | Stop reason: SDUPTHER

## 2023-12-27 RX ORDER — GLYCOPYRROLATE 0.2 MG/ML
INJECTION INTRAMUSCULAR; INTRAVENOUS PRN
Status: DISCONTINUED | OUTPATIENT
Start: 2023-12-27 | End: 2023-12-27 | Stop reason: SDUPTHER

## 2023-12-27 RX ORDER — MIDAZOLAM HYDROCHLORIDE 1 MG/ML
INJECTION INTRAMUSCULAR; INTRAVENOUS PRN
Status: DISCONTINUED | OUTPATIENT
Start: 2023-12-27 | End: 2023-12-27 | Stop reason: SDUPTHER

## 2023-12-27 RX ORDER — ROPIVACAINE HYDROCHLORIDE 5 MG/ML
INJECTION, SOLUTION EPIDURAL; INFILTRATION; PERINEURAL PRN
Status: DISCONTINUED | OUTPATIENT
Start: 2023-12-27 | End: 2023-12-27 | Stop reason: SDUPTHER

## 2023-12-27 RX ORDER — ACETAMINOPHEN 325 MG/1
650 TABLET ORAL EVERY 6 HOURS
Status: DISCONTINUED | OUTPATIENT
Start: 2023-12-27 | End: 2023-12-27 | Stop reason: HOSPADM

## 2023-12-27 RX ORDER — OXYCODONE HYDROCHLORIDE 5 MG/1
5 TABLET ORAL
Status: DISCONTINUED | OUTPATIENT
Start: 2023-12-27 | End: 2023-12-27 | Stop reason: HOSPADM

## 2023-12-27 RX ORDER — SODIUM CHLORIDE 9 MG/ML
INJECTION, SOLUTION INTRAVENOUS PRN
Status: DISCONTINUED | OUTPATIENT
Start: 2023-12-27 | End: 2023-12-27 | Stop reason: HOSPADM

## 2023-12-27 RX ORDER — METOCLOPRAMIDE HYDROCHLORIDE 5 MG/ML
10 INJECTION INTRAMUSCULAR; INTRAVENOUS
Status: DISCONTINUED | OUTPATIENT
Start: 2023-12-27 | End: 2023-12-27 | Stop reason: HOSPADM

## 2023-12-27 RX ORDER — OXYCODONE HYDROCHLORIDE 5 MG/1
5 TABLET ORAL EVERY 4 HOURS PRN
Qty: 28 TABLET | Refills: 0 | Status: SHIPPED | OUTPATIENT
Start: 2023-12-27 | End: 2024-01-03

## 2023-12-27 RX ORDER — OXYCODONE HYDROCHLORIDE 5 MG/1
5 CAPSULE ORAL EVERY 6 HOURS PRN
Status: DISCONTINUED | OUTPATIENT
Start: 2023-12-27 | End: 2023-12-27 | Stop reason: HOSPADM

## 2023-12-27 RX ORDER — MAGNESIUM HYDROXIDE 1200 MG/15ML
LIQUID ORAL CONTINUOUS PRN
Status: DISCONTINUED | OUTPATIENT
Start: 2023-12-27 | End: 2023-12-27 | Stop reason: HOSPADM

## 2023-12-27 RX ORDER — ESMOLOL HYDROCHLORIDE 10 MG/ML
INJECTION INTRAVENOUS PRN
Status: DISCONTINUED | OUTPATIENT
Start: 2023-12-27 | End: 2023-12-27 | Stop reason: SDUPTHER

## 2023-12-27 RX ORDER — LIDOCAINE HYDROCHLORIDE 10 MG/ML
INJECTION, SOLUTION EPIDURAL; INFILTRATION; INTRACAUDAL; PERINEURAL PRN
Status: DISCONTINUED | OUTPATIENT
Start: 2023-12-27 | End: 2023-12-27 | Stop reason: SDUPTHER

## 2023-12-27 RX ORDER — ONDANSETRON 2 MG/ML
4 INJECTION INTRAMUSCULAR; INTRAVENOUS
Status: DISCONTINUED | OUTPATIENT
Start: 2023-12-27 | End: 2023-12-27 | Stop reason: HOSPADM

## 2023-12-27 RX ORDER — MEPERIDINE HYDROCHLORIDE 25 MG/ML
12.5 INJECTION INTRAMUSCULAR; INTRAVENOUS; SUBCUTANEOUS
Status: DISCONTINUED | OUTPATIENT
Start: 2023-12-27 | End: 2023-12-27 | Stop reason: HOSPADM

## 2023-12-27 RX ORDER — SODIUM CHLORIDE 0.9 % (FLUSH) 0.9 %
5-40 SYRINGE (ML) INJECTION EVERY 12 HOURS SCHEDULED
Status: DISCONTINUED | OUTPATIENT
Start: 2023-12-27 | End: 2023-12-27 | Stop reason: HOSPADM

## 2023-12-27 RX ORDER — DEXAMETHASONE SODIUM PHOSPHATE 10 MG/ML
INJECTION INTRAMUSCULAR; INTRAVENOUS PRN
Status: DISCONTINUED | OUTPATIENT
Start: 2023-12-27 | End: 2023-12-27 | Stop reason: SDUPTHER

## 2023-12-27 RX ORDER — ROCURONIUM BROMIDE 10 MG/ML
INJECTION, SOLUTION INTRAVENOUS PRN
Status: DISCONTINUED | OUTPATIENT
Start: 2023-12-27 | End: 2023-12-27 | Stop reason: SDUPTHER

## 2023-12-27 RX ORDER — DIPHENHYDRAMINE HYDROCHLORIDE 50 MG/ML
12.5 INJECTION INTRAMUSCULAR; INTRAVENOUS
Status: DISCONTINUED | OUTPATIENT
Start: 2023-12-27 | End: 2023-12-27 | Stop reason: HOSPADM

## 2023-12-27 RX ORDER — FENTANYL CITRATE 0.05 MG/ML
50 INJECTION, SOLUTION INTRAMUSCULAR; INTRAVENOUS EVERY 10 MIN PRN
Status: DISCONTINUED | OUTPATIENT
Start: 2023-12-27 | End: 2023-12-27 | Stop reason: HOSPADM

## 2023-12-27 RX ADMIN — LIDOCAINE HYDROCHLORIDE 50 MG: 10 INJECTION, SOLUTION EPIDURAL; INFILTRATION; INTRACAUDAL; PERINEURAL at 12:44

## 2023-12-27 RX ADMIN — OXYCODONE HYDROCHLORIDE 5 MG: 5 TABLET ORAL at 08:42

## 2023-12-27 RX ADMIN — ROPIVACAINE HYDROCHLORIDE 30 ML: 5 INJECTION, SOLUTION EPIDURAL; INFILTRATION; PERINEURAL at 12:04

## 2023-12-27 RX ADMIN — OXYCODONE HYDROCHLORIDE 5 MG: 5 CAPSULE ORAL at 16:58

## 2023-12-27 RX ADMIN — ONDANSETRON 4 MG: 2 INJECTION INTRAMUSCULAR; INTRAVENOUS at 13:43

## 2023-12-27 RX ADMIN — TRANEXAMIC ACID 1950 MG: 650 TABLET ORAL at 11:45

## 2023-12-27 RX ADMIN — ESMOLOL HYDROCHLORIDE 30 MG: 10 INJECTION, SOLUTION INTRAVENOUS at 13:08

## 2023-12-27 RX ADMIN — OXYCODONE HYDROCHLORIDE 5 MG: 5 TABLET ORAL at 03:12

## 2023-12-27 RX ADMIN — PROPOFOL 200 MG: 10 INJECTION, EMULSION INTRAVENOUS at 12:44

## 2023-12-27 RX ADMIN — DEXAMETHASONE SODIUM PHOSPHATE 10 MG: 10 INJECTION INTRAMUSCULAR; INTRAVENOUS at 12:56

## 2023-12-27 RX ADMIN — PHENYLEPHRINE HYDROCHLORIDE 200 MCG: 10 INJECTION INTRAVENOUS at 13:26

## 2023-12-27 RX ADMIN — ACETAMINOPHEN 1000 MG: 500 TABLET ORAL at 11:45

## 2023-12-27 RX ADMIN — MIDAZOLAM HYDROCHLORIDE 2 MG: 1 INJECTION, SOLUTION INTRAMUSCULAR; INTRAVENOUS at 11:59

## 2023-12-27 RX ADMIN — GLYCOPYRROLATE 0.2 MG: 0.2 INJECTION INTRAMUSCULAR; INTRAVENOUS at 12:58

## 2023-12-27 RX ADMIN — ROCURONIUM BROMIDE 20 MG: 10 INJECTION, SOLUTION INTRAVENOUS at 13:20

## 2023-12-27 RX ADMIN — CEFAZOLIN 2000 MG: 2 INJECTION, POWDER, FOR SOLUTION INTRAMUSCULAR; INTRAVENOUS at 12:42

## 2023-12-27 RX ADMIN — PHENYLEPHRINE HYDROCHLORIDE 100 MCG: 10 INJECTION INTRAVENOUS at 13:50

## 2023-12-27 RX ADMIN — SODIUM CHLORIDE: 9 INJECTION, SOLUTION INTRAVENOUS at 11:45

## 2023-12-27 RX ADMIN — SODIUM CHLORIDE: 9 INJECTION, SOLUTION INTRAVENOUS at 13:29

## 2023-12-27 RX ADMIN — SUGAMMADEX 200 MG: 100 INJECTION, SOLUTION INTRAVENOUS at 13:49

## 2023-12-27 RX ADMIN — FENTANYL CITRATE 50 MCG: 0.05 INJECTION, SOLUTION INTRAMUSCULAR; INTRAVENOUS at 14:28

## 2023-12-27 RX ADMIN — OXYCODONE HYDROCHLORIDE 10 MG: 10 TABLET, FILM COATED, EXTENDED RELEASE ORAL at 11:45

## 2023-12-27 RX ADMIN — ACETAMINOPHEN 650 MG: 325 TABLET ORAL at 16:58

## 2023-12-27 RX ADMIN — ROCURONIUM BROMIDE 50 MG: 10 INJECTION, SOLUTION INTRAVENOUS at 12:44

## 2023-12-27 RX ADMIN — SODIUM CHLORIDE: 9 INJECTION, SOLUTION INTRAVENOUS at 08:44

## 2023-12-27 NOTE — DISCHARGE SUMMARY
Discharge summary  This patient Wade Bhardwaj was admitted to the hospital on 12/26/2023  after undergoing Procedure(s) (LRB):  SPINE: L4-5 LEFT MICRODISECTOMY PRONE DILCIA TABLE, NEW MICROSCOPE, NEW C-ARM, LUMBAR BRACE, GLOBUS EQUIPMENT MIS TUBES (PT TO BE ADMITTED TODAY)  [facility] (N/A) without complications that morning. During the postoperative period,while in hospital, patient was medically managed by the hospitalist. Please see medial notes and H&P for patients additional diagnoses. Ortho agrees with all medical diagnoses and treatments while patient in hospital.  No significant or unexpected findings or abnormal ortho imaging were noted during the hospital stay    Hospital course  Patient tolerated surgical procedure well and there was no complications. Patient progressed adequtly through their recovery during hospital stay including PT and rehabilitation. Patient was then D/C on  to Home  in stable condition. Patient was instructed on the use of pain medications, the signs and symptoms of infection, and was given our number to call should they have any questions or concerns following discharge.

## 2023-12-27 NOTE — OP NOTE
Operative Note      Patient: Natalie Victoria  YOB: 1970  MRN: [id number]    Date of Procedure: 12/27/2023    Pre-Op Diagnosis Codes:     * Herniated lumbar intervertebral disc [M51.26]    Post-Op Diagnosis: Same       Procedure(s):  SPINE: L4-5 LEFT MICRODISECTOMY    Surgeon(s):  Wing Gatica MD    Assistant:   Physician Assistant: RHYS Medel    Anesthesia: General    Estimated Blood Loss (mL): Minimal    Complications: None    Specimens:   ID Type Source Tests Collected by Time Destination   A : L4-L5 DISC Tissue Spine SURGICAL PATHOLOGY Wing Gatica MD 12/27/2023 1347        Implants:  * No implants in log *      Drains: * No LDAs found *    Findings: L4-5 left herniated disc. Lumbar spondylosis. Detailed Description of Procedure:   Preoperative diagnosis: L4-5 left herniated disc. Lumbar spondylosis. Postop diagnosis: Above    Procedure: 1) L4-5 left microdiscectomy                    2) thermal ablation of the medial nerve branch of the L4-5 left facet. Surgeon: Wing Gatica M.D. Asst.: Jose R CAMARA The physician assistant was present through the entire case. Given the nature of the disease process and the procedure to be performed a skilled surgical assistant was necessary during the case. The assistant was necessary in order to hold retractors and directly assist in the operation. A certified scrub tech was at the back table managing instruments and supplies for the surgical case. Anesthesia: General    HPI: The patient had leg symptoms from the above-described herniated disc. The patient failed all nonoperative treatment. All of the risks, benefits, and potential complications for operative and nonoperative treatment were discussed at length with the patient. Risks of operative intervention include, but are not limited to:  Anesthesia complications, excessive blood loss, infection, damaged to uninjured structures including, but not limited

## 2023-12-27 NOTE — ANESTHESIA POSTPROCEDURE EVALUATION
Department of Anesthesiology  Postprocedure Note    Patient: Wyatt Thompson  MRN: [id number]  YOB: 1970  Date of evaluation: 12/27/2023    Procedure Summary       Date: 12/27/23 Room / Location: [address]    Anesthesia Start: 9136 Anesthesia Stop: 3747    Procedure: SPINE: L4-5 LEFT MICRODISECTOMY (Left: Back) Diagnosis:       Herniated lumbar intervertebral disc      (Herniated lumbar intervertebral disc [M51.26])    Surgeons: Ganesh Garcia MD Responsible Provider: Kailyn Oliva MD    Anesthesia Type: general, regional ASA Status: 2            Anesthesia Type: No value filed. Donavon Phase I: Donavon Score: 10    Donavon Phase II:      Anesthesia Post Evaluation    Patient location during evaluation: PACU  Patient participation: complete - patient cannot participate  Level of consciousness: awake  Pain score: 0  Airway patency: patent  Nausea & Vomiting: no vomiting and no nausea  Cardiovascular status: blood pressure returned to baseline  Respiratory status: acceptable  Hydration status: stable  Pain management: adequate        No notable events documented.

## 2023-12-27 NOTE — ANESTHESIA PROCEDURE NOTES
Peripheral Block    Patient location during procedure: pre-op  Reason for block: post-op pain management and at surgeon's request  Start time: 12/27/2023 12:00 PM  End time: 12/27/2023 12:07 PM  Staffing  Performed: anesthesiologist   Anesthesiologist: Pancho Christensen MD  Performed by: Pancho Christensen MD  Authorized by: Pancho Christensen MD    Preanesthetic Checklist  Completed: patient identified, IV checked, site marked, risks and benefits discussed, surgical/procedural consents, equipment checked, pre-op evaluation, timeout performed, anesthesia consent given, oxygen available and monitors applied/VS acknowledged  Peripheral Block   Patient position: sitting  Prep: ChloraPrep  Provider prep: mask and sterile gloves (Sterile probe cover)  Patient monitoring: cardiac monitor, continuous pulse ox, frequent blood pressure checks and IV access  Block type: Erector spinae  Laterality: bilateral  Injection technique: single-shot  Guidance: ultrasound guided  Local infiltration: ropivacaine (20 cc of NS added)  Infiltration strength: 0.5 %  Local infiltration: ropivacaine (20 cc of NS added)  Dose: 30 mL    Needle   Needle type: combined needle/nerve stimulator   Needle gauge: 21 G  Needle localization: anatomical landmarks and ultrasound guidance  Needle length: 10 cm  Assessment   Injection assessment: negative aspiration for heme, no paresthesia on injection and local visualized surrounding nerve on ultrasound  Paresthesia pain: immediately resolved  Slow fractionated injection: yes  Hemodynamics: stable  Real-time US image taken/store: yes    Additional Notes  Ultrasound image printed and saved in patient chart.     Sterile probe cover used

## 2023-12-27 NOTE — DISCHARGE INSTRUCTIONS
Medication given may have significant effects after discharge. Therefore on the day of surgery:  1) you should be accompanied by a responsible adult upon discharge and for 24 hours after surgery. Do not drive a motor vehicle, operate machinery, power tools or appliance, drink alcoholic beverages, or make critical decisions for 24 hours  2) Be aware of dizziness, which may cause a fall. Change positions slowly. 3) Eating: you may resume your regular diet but it is better to increase intake slowly with mild foods and working up to your regular diet. 4) Nausea/Vomiting: Nausea and vomiting may occur as you become more active or begin to increase food intake. If this should happen, decrease activity and return to liquids. 5) Pain: Your surgeon may have given you a prescription for pain medication. Take pain medication with food as prescribed. Pain medication may cause constipation, so drink plenty of fluids. You may need to use laxatives. 6) Ice: You may use a cool pack to operative site for 20 min 5-6 times a day as needed for comfort. 8) Dressing: Change dressing as frequently as needed to keep clean and dry. Remove all sticky tape in 5 days. May shower in three days. Do not put soap or soak on the incision until healed. 9) INCREASE ACTIVITY AS TOLERATED AND AS INSTRUCTED. GO BY HOW YOU FEEL. 10) See physical therapist when advised by your physician  11) Call your doctor at [phone] for an appointment (or follow up as scheduled). appointment. Contact OFFICE IF  Increased redness, swelling, excess drainage, and/or pain to surgery site. As well as new onset fevers and or chills. These could signify an infection. Calf or thigh tenderness to touch as well as increased swelling or redness. This could signify a clot formation. Numbness or tingling to an area around the incision site or below the incision site (toes). Or if the operative extremity becomes cold, blue.   Any rash appears, increased  or

## 2023-12-27 NOTE — ANESTHESIA PRE PROCEDURE
\"PREGSERUM\", \"HCG\", \"HCGQUANT\"     ABGs: No results found for: \"PHART\", \"PO2ART\", \"XZY2BCY\", \"MBI5UJF\", \"BEART\", \"X1ILQZSS\"     Type & Screen (If Applicable):  No results found for: \"LABABO\", \"LABRH\"    Drug/Infectious Status (If Applicable):  No results found for: \"HIV\", \"HEPCAB\"    COVID-19 Screening (If Applicable): No results found for: \"COVID19\"        Anesthesia Evaluation    Airway: Mallampati: II  TM distance: >3 FB   Neck ROM: full  Mouth opening: > = 3 FB   Dental: normal exam         Pulmonary: breath sounds clear to auscultation  (+)           asthma: allergic asthma,                            Cardiovascular:Negative CV ROS          ECG reviewed  Rhythm: regular                      Neuro/Psych:   Negative Neuro/Psych ROS              GI/Hepatic/Renal: Neg GI/Hepatic/Renal ROS            Endo/Other: Negative Endo/Other ROS                    Abdominal:             Vascular: negative vascular ROS. Other Findings:       Anesthesia Plan      general and regional     ASA 2     (US guided CORDELL block)  Induction: intravenous. MIPS: Prophylactic antiemetics administered. Anesthetic plan and risks discussed with patient. Use of blood products discussed with patient whom consented to blood products.     Plan discussed with CRNA and surgical team.    Attending anesthesiologist reviewed and agrees with Preprocedure content      Post-op pain plan if not by surgeon: single peripheral nerve block        Marguarite Favre, MD   12/27/2023

## 2023-12-27 NOTE — PROGRESS NOTES
12/26/23  1436 12/26/23  1549    135   K 4.1 3.9    98   CO2 27 26   BUN 11 10   CREATININE 0.65 0.64   GLUCOSE 111* 103*     PT/INR:  Recent Labs     12/26/23  1437   PROTIME 13.5   INR 1.0     APTT:  Recent Labs     12/26/23  1549   APTT 25.4     LIVER PROFILE:  Recent Labs     12/26/23  1549   AST 10   ALT 10   BILITOT 0.3   ALKPHOS 63       Imaging Last 24 Hours:  No results found. Assessment//Plan           Hospital Problems             Last Modified POA    * (Principal) Lumbar herniated disc 12/26/2023 Yes   Encounter for preop  Assessment & Plan  12/27: Patient going for surgery today. Denies any chest pain or shortness of breath. Denies any tobacco use. Continue current care. No overnight events. Pain is controlled. Will follow patient with you.   Level 2  Electronically signed by Edd Cisneros MD on 12/27/23 at 11:42 AM EST
Raw Score: 24  AM-PAC Inpatient ADL T-Scale Score : 57.54  ADL Inpatient CMS 0-100% Score: 0    Therapy key for assistance levels -   Independent/Mod I = Pt. is able to perform task with no assistance but may require a device   Stand by assistance = Pt. does not perform task at an independent level but does not need physical assistance, requires verbal cues  Minimal, Moderate, Maximal Assistance = Pt. requires physical assistance (25%, 50%, 75% assist from helper) for task but is able to actively participate in task   Dependent = Pt. requires total assistance with task and is not able to actively participate with task completion     Plan:  Occupational Therapy Plan  Therapy Duration:  (no acute OT)    Goals:   No OT goals identified  Discussed and agreed upon: Yes Comments:       Therapy Time:   Individual   Time In 1552   Time Out 1606   Minutes 14          Eval: 14 minutes     Electronically signed by:    Monica Moreno OT,   12/27/2023, 4:22 PM

## 2024-01-01 ENCOUNTER — HOSPITAL ENCOUNTER (EMERGENCY)
Age: 54
Discharge: HOME | End: 2024-01-02
Attending: STUDENT IN AN ORGANIZED HEALTH CARE EDUCATION/TRAINING PROGRAM
Payer: COMMERCIAL

## 2024-01-01 ENCOUNTER — APPOINTMENT (OUTPATIENT)
Dept: ULTRASOUND IMAGING | Age: 54
End: 2024-01-01
Payer: COMMERCIAL

## 2024-01-01 VITALS
DIASTOLIC BLOOD PRESSURE: 57 MMHG | SYSTOLIC BLOOD PRESSURE: 142 MMHG | RESPIRATION RATE: 18 BRPM | OXYGEN SATURATION: 97 % | TEMPERATURE: 98.6 F | HEART RATE: 77 BPM

## 2024-01-01 DIAGNOSIS — M79.89 LEFT LEG SWELLING: Primary | ICD-10-CM

## 2024-01-01 PROCEDURE — 99284 EMERGENCY DEPT VISIT MOD MDM: CPT

## 2024-01-01 PROCEDURE — 93971 EXTREMITY STUDY: CPT

## 2024-01-01 ASSESSMENT — ENCOUNTER SYMPTOMS
COLOR CHANGE: 0
SHORTNESS OF BREATH: 0

## 2024-01-02 NOTE — ED NOTES
Pt provided discharge instructions, verbalizes understanding. Respirations even and unlabored, NAD noted. Pt ambulatory to the lobby following discharge, gait steady and even. No further needs expressed.

## 2024-01-02 NOTE — DISCHARGE INSTRUCTIONS
He was seen in the emergency room for swelling to the left ankle.  A study looking at the veins did not show any deep clots consistent with a DVT.  You are safe for discharge home.  Please be sure to elevate your legs while at rest.  You can also use compression stockings, that extend to the knees, to help with swelling.

## 2024-01-02 NOTE — ED PROVIDER NOTES
[facility] ED  EMERGENCY DEPARTMENT ENCOUNTER      Pt Name: Yu Mancia  MRN: [id number]  Birthdate 1970  Date of evaluation: 1/1/2024  Provider: Janice Aldana MD    CHIEF COMPLAINT       Chief Complaint   Patient presents with    Leg Swelling     Left ankle         HISTORY OF PRESENT ILLNESS   (Location/Symptom, Timing/Onset, Context/Setting, Quality, Duration, Modifying Factors, Severity)  Note limiting factors.   Yu Mancia is a 53 y.o. female who presents to the emergency department for left ankle swelling.      HPI  53-year-old female with past medical history notable for asthma and recent microdiscectomy L4/L5 5 days ago who presents to the emergency department for evaluation of gradually increasing left ankle swelling ongoing for 1 day duration.  She does report that she has been seated more than normal given recovery from surgery, but denies any injury or other modifying factors.  Reports that she was told to look out for blood clots following surgery.  No personal or family history of blood clot.  She reports some associated discomfort along the proximal anterior lateral lower extremity without associated color change.  No associated chest pain, shortness of breath, lightheadedness, syncope, palpitations or other symptoms.    Nursing Notes were reviewed.    REVIEW OF SYSTEMS    (2-9 systems for level 4, 10 or more for level 5)     Review of Systems   Constitutional:  Negative for chills and fever.   Respiratory:  Negative for shortness of breath.    Cardiovascular:  Positive for leg swelling. Negative for chest pain and palpitations.   Musculoskeletal:  Positive for arthralgias.   Skin:  Negative for color change.   Neurological:  Negative for syncope and light-headedness.       Except as noted above the remainder of the review of systems was reviewed and negative.       PAST MEDICAL HISTORY     Past Medical History:   Diagnosis Date    Asthma     Back pain          SURGICAL HISTORY

## 2024-01-02 NOTE — ED NOTES
Pt states had surgery on spine Wednesday now left ankle is swollen and painful to the touch. Concerns for blood clot.

## 2024-01-05 ENCOUNTER — TELEPHONE (OUTPATIENT)
Dept: ORTHOPEDIC SURGERY | Facility: CLINIC | Age: 54
End: 2024-01-05
Payer: COMMERCIAL

## 2024-01-05 NOTE — TELEPHONE ENCOUNTER
Rec'd a VM From Pt that the surgery site is swelling and surgery was last week. Wants to make sure this is normal?

## 2024-01-09 ENCOUNTER — OFFICE VISIT (OUTPATIENT)
Dept: ORTHOPEDIC SURGERY | Facility: CLINIC | Age: 54
End: 2024-01-09
Payer: COMMERCIAL

## 2024-01-09 ENCOUNTER — APPOINTMENT (OUTPATIENT)
Dept: ORTHOPEDIC SURGERY | Facility: CLINIC | Age: 54
End: 2024-01-09
Payer: COMMERCIAL

## 2024-01-09 DIAGNOSIS — M54.16 LUMBAR RADICULOPATHY: Primary | ICD-10-CM

## 2024-01-09 PROCEDURE — 99024 POSTOP FOLLOW-UP VISIT: CPT | Performed by: ORTHOPAEDIC SURGERY

## 2024-01-09 NOTE — PROGRESS NOTES
Kristy Hoover is a 53 y.o. female who presents for Post-op and Worker's Compensation of the Lower Back (L4-5 Left Microdisc from 12/27/2024/13 days PO).    HPI:  53-year-old female here for postop visit.  She is 2 weeks out from a left L4-5 microdiscectomy.  She denies any fever chills nausea vomiting night sweats.  She has no bowel or bladder complaints.    Physical exam:  Well-nourished, well kept.No lymphangitis or lymphadenopathy in the examined extremities.  Gait normal.  Examination of the back shows minimal tenderness in the paraspinous musculature.  There is normal decreased range of motion in all directions due to guarding/muscle spasms and pain at extremes.  There is good strength and no instability.  Examination of the lower extremities reveals no point tenderness, swelling, or deformity.  Range of motion of the hips, knees, and ankles are full without crepitance, instability, or exacerbation of pain.  Strength is 5/5 throughout.  No redness, abrasions, or lesions on extremities  Gross sensation intact in the extremities.  Deep tendon reflexes 2+ bilateral. Clonus negative.  Affect normal.  Alert and oriented ×3.  Coordination normal.  Incision is well-healed.  No redness tenderness swelling or warmth.  2 suture tags were cut.    No imaging studies were done today.    Assessment:  53-year-old female here for postop visit.  2 weeks out from a left L4-5 microdiscectomy.  She is doing very good today.  Probably 80 to 90% better than she was prior to surgery.  She was then in extreme pain and nearly nonfunctional because of this.  She is doing much better now.  We discussed postoperative restrictions.  And brace instructions again.    Plan:  We will see her back on an as-needed basis

## (undated) DEVICE — FRAZIER SUCTION INSTRUMENT 12 FR W/CONTROL VENT & OBTURATOR: Brand: FRAZIER

## (undated) DEVICE — Device

## (undated) DEVICE — COVER,TABLE,44X90,STERILE: Brand: MEDLINE

## (undated) DEVICE — STRIP,CLOSURE,WOUND,MEDI-STRIP,1/2X4: Brand: MEDLINE

## (undated) DEVICE — FLOSEAL WITH RECOTHROM - 10ML.: Brand: FLOSEAL HEMOSTATIC MATRIX

## (undated) DEVICE — SPONGE GZ W4XL4IN COT 12 PLY TYP VII WVN C FLD DSGN STERILE

## (undated) DEVICE — 3M™ IOBAN™ 2 ANTIMICROBIAL INCISE DRAPE 6651EZ: Brand: IOBAN™ 2

## (undated) DEVICE — 3M™ TEGADERM™ TRANSPARENT FILM DRESSING FRAME STYLE, 1626W, 4 IN X 4-3/4 IN (10 CM X 12 CM), 50/CT 4CT/CASE: Brand: 3M™ TEGADERM™

## (undated) DEVICE — BLADE ES ELASTOMERIC COAT INSUL DURABLE BEND UPTO 90DEG

## (undated) DEVICE — COVER MICSCP W46XL120IN 4 BINOC GLS LENS LEICA

## (undated) DEVICE — SINGLE PORT MANIFOLD: Brand: NEPTUNE 2

## (undated) DEVICE — 1010 S-DRAPE TOWEL DRAPE 10/BX: Brand: STERI-DRAPE™

## (undated) DEVICE — NEURO: Brand: MEDLINE INDUSTRIES, INC.

## (undated) DEVICE — GLOVE ORANGE PI 7 1/2   MSG9075

## (undated) DEVICE — GOWN,SIRUS,NONRNF,SETINSLV,2XL,18/CS: Brand: MEDLINE

## (undated) DEVICE — SPONGE,DISSECTOR,K,XRAY,9/16"X1/4",STRL: Brand: MEDLINE

## (undated) DEVICE — DRAPE,ISOLATION,INCISE,INVISISHIELD: Brand: MEDLINE

## (undated) DEVICE — LABEL MED MINI W/ MARKER

## (undated) DEVICE — SYRINGE MED 30ML STD CLR PLAS LUERLOCK TIP N CTRL DISP

## (undated) DEVICE — 1LYRTR 16FR10ML 100%SILI SNAP: Brand: MEDLINE INDUSTRIES, INC.

## (undated) DEVICE — KIT JACK TBL PT CARE

## (undated) DEVICE — ALCOHOL RUBBING ISO 16OZ 70%

## (undated) DEVICE — BLADE ES L6IN ELASTOMERIC COAT INSUL DURABLE BEND UPTO

## (undated) DEVICE — SPONGE,NEURO,.75"X.75",XR,STRL,LF,10/PK: Brand: MEDLINE

## (undated) DEVICE — PRECISION MATCH HEAD